# Patient Record
Sex: MALE | Race: WHITE | Employment: UNEMPLOYED | URBAN - METROPOLITAN AREA
[De-identification: names, ages, dates, MRNs, and addresses within clinical notes are randomized per-mention and may not be internally consistent; named-entity substitution may affect disease eponyms.]

---

## 2017-08-08 ENCOUNTER — HOSPITAL ENCOUNTER (OUTPATIENT)
Age: 82
Setting detail: SPECIMEN
Discharge: HOME | End: 2017-08-08
Payer: MEDICARE

## 2017-08-10 LAB — DERMATOLOGY PATHOLOGY REPORT: NORMAL

## 2017-10-11 ENCOUNTER — HOSPITAL ENCOUNTER (OUTPATIENT)
Age: 82
Discharge: HOME | End: 2017-10-11
Payer: MEDICARE

## 2022-11-30 ENCOUNTER — APPOINTMENT (OUTPATIENT)
Dept: GENERAL RADIOLOGY | Age: 87
End: 2022-11-30
Payer: MEDICARE

## 2022-11-30 ENCOUNTER — APPOINTMENT (OUTPATIENT)
Dept: CT IMAGING | Age: 87
End: 2022-11-30
Payer: MEDICARE

## 2022-11-30 ENCOUNTER — HOSPITAL ENCOUNTER (EMERGENCY)
Age: 87
Discharge: NURSING, REHAB OR LONG-TERM CARE | End: 2022-11-30
Attending: EMERGENCY MEDICINE
Payer: MEDICARE

## 2022-11-30 VITALS
TEMPERATURE: 97.9 F | SYSTOLIC BLOOD PRESSURE: 168 MMHG | DIASTOLIC BLOOD PRESSURE: 98 MMHG | HEART RATE: 76 BPM | OXYGEN SATURATION: 94 % | RESPIRATION RATE: 12 BRPM | HEIGHT: 60 IN

## 2022-11-30 DIAGNOSIS — E86.0 DEHYDRATION: ICD-10-CM

## 2022-11-30 DIAGNOSIS — U07.1 COVID-19: Primary | ICD-10-CM

## 2022-11-30 LAB
ABSOLUTE EOS #: <0.03 K/UL (ref 0–0.44)
ABSOLUTE IMMATURE GRANULOCYTE: 0.03 K/UL (ref 0–0.3)
ABSOLUTE LYMPH #: 0.77 K/UL (ref 1.1–3.7)
ABSOLUTE MONO #: 1.06 K/UL (ref 0.1–1.2)
ACETAMINOPHEN LEVEL: <10 UG/ML (ref 10–30)
ALBUMIN SERPL-MCNC: 3.4 G/DL (ref 3.5–5.2)
ALP BLD-CCNC: 83 U/L (ref 40–129)
ALT SERPL-CCNC: 16 U/L (ref 5–41)
AMMONIA: 14 UMOL/L (ref 16–60)
AMORPHOUS: ABNORMAL
AMPHETAMINE SCREEN URINE: NEGATIVE
ANION GAP SERPL CALCULATED.3IONS-SCNC: 12 MMOL/L (ref 9–17)
AST SERPL-CCNC: 21 U/L
BARBITURATE SCREEN URINE: NEGATIVE
BASOPHILS # BLD: 0 % (ref 0–2)
BASOPHILS ABSOLUTE: <0.03 K/UL (ref 0–0.2)
BENZODIAZEPINE SCREEN, URINE: NEGATIVE
BILIRUB SERPL-MCNC: 0.4 MG/DL (ref 0.3–1.2)
BILIRUBIN DIRECT: 0.2 MG/DL
BILIRUBIN URINE: NEGATIVE
BILIRUBIN, INDIRECT: 0.2 MG/DL (ref 0–1)
BUN BLDV-MCNC: 63 MG/DL (ref 8–23)
BUN/CREAT BLD: 26 (ref 9–20)
CALCIUM SERPL-MCNC: 9.1 MG/DL (ref 8.6–10.4)
CANNABINOID SCREEN URINE: NEGATIVE
CASTS UA: ABNORMAL /LPF
CASTS UA: ABNORMAL /LPF
CHLORIDE BLD-SCNC: 100 MMOL/L (ref 98–107)
CO2: 25 MMOL/L (ref 20–31)
COCAINE METABOLITE, URINE: NEGATIVE
COLOR: YELLOW
CREAT SERPL-MCNC: 2.39 MG/DL (ref 0.7–1.2)
EKG ATRIAL RATE: 56 BPM
EKG P AXIS: 31 DEGREES
EKG P-R INTERVAL: 162 MS
EKG Q-T INTERVAL: 452 MS
EKG QRS DURATION: 100 MS
EKG QTC CALCULATION (BAZETT): 436 MS
EKG R AXIS: 24 DEGREES
EKG T AXIS: 9 DEGREES
EKG VENTRICULAR RATE: 56 BPM
EOSINOPHILS RELATIVE PERCENT: 0 % (ref 1–4)
EPITHELIAL CELLS UA: ABNORMAL /HPF (ref 0–5)
ETHANOL PERCENT: <0.01 %
ETHANOL: <10 MG/DL
FENTANYL URINE: NEGATIVE
FLU A ANTIGEN: NEGATIVE
FLU B ANTIGEN: NEGATIVE
GFR SERPL CREATININE-BSD FRML MDRD: 25 ML/MIN/1.73M2
GLUCOSE BLD-MCNC: 118 MG/DL (ref 70–99)
GLUCOSE URINE: NEGATIVE
HCT VFR BLD CALC: 31.2 % (ref 40.7–50.3)
HEMOGLOBIN: 9.8 G/DL (ref 13–17)
IMMATURE GRANULOCYTES: 0 %
KETONES, URINE: NEGATIVE
LACTIC ACID, SEPSIS: 1 MMOL/L (ref 0.5–1.9)
LEUKOCYTE ESTERASE, URINE: NEGATIVE
LYMPHOCYTES # BLD: 11 % (ref 24–43)
MCH RBC QN AUTO: 30.2 PG (ref 25.2–33.5)
MCHC RBC AUTO-ENTMCNC: 31.4 G/DL (ref 28.4–34.8)
MCV RBC AUTO: 96 FL (ref 82.6–102.9)
METHADONE SCREEN, URINE: NEGATIVE
MONOCYTES # BLD: 14 % (ref 3–12)
MYOGLOBIN: 179 NG/ML (ref 28–72)
NITRITE, URINE: NEGATIVE
NRBC AUTOMATED: 0 PER 100 WBC
OPIATES, URINE: NEGATIVE
OXYCODONE SCREEN URINE: NEGATIVE
PDW BLD-RTO: 13 % (ref 11.8–14.4)
PH UA: 5.5 (ref 5–8)
PHENCYCLIDINE, URINE: NEGATIVE
PLATELET # BLD: 225 K/UL (ref 138–453)
PMV BLD AUTO: 9.5 FL (ref 8.1–13.5)
POTASSIUM SERPL-SCNC: 3.4 MMOL/L (ref 3.7–5.3)
PRO-BNP: 434 PG/ML
PROTEIN UA: ABNORMAL
RBC # BLD: 3.25 M/UL (ref 4.21–5.77)
RBC UA: ABNORMAL /HPF (ref 0–2)
SALICYLATE LEVEL: <1 MG/DL (ref 3–10)
SARS-COV-2, RAPID: DETECTED
SEG NEUTROPHILS: 75 % (ref 36–65)
SEGMENTED NEUTROPHILS ABSOLUTE COUNT: 5.44 K/UL (ref 1.5–8.1)
SODIUM BLD-SCNC: 137 MMOL/L (ref 135–144)
SPECIFIC GRAVITY UA: 1.02 (ref 1–1.03)
SPECIMEN DESCRIPTION: ABNORMAL
TEST INFORMATION: NORMAL
TOTAL PROTEIN: 6.8 G/DL (ref 6.4–8.3)
TOXIC TRICYCLIC SC,BLOOD: NEGATIVE
TROPONIN, HIGH SENSITIVITY: 65 NG/L (ref 0–22)
TURBIDITY: CLEAR
URINE HGB: ABNORMAL
UROBILINOGEN, URINE: NORMAL
WBC # BLD: 7.3 K/UL (ref 3.5–11.3)
WBC UA: ABNORMAL /HPF (ref 0–5)

## 2022-11-30 PROCEDURE — 87804 INFLUENZA ASSAY W/OPTIC: CPT

## 2022-11-30 PROCEDURE — 80179 DRUG ASSAY SALICYLATE: CPT

## 2022-11-30 PROCEDURE — 93005 ELECTROCARDIOGRAM TRACING: CPT | Performed by: NURSE PRACTITIONER

## 2022-11-30 PROCEDURE — 70450 CT HEAD/BRAIN W/O DYE: CPT

## 2022-11-30 PROCEDURE — 80307 DRUG TEST PRSMV CHEM ANLYZR: CPT

## 2022-11-30 PROCEDURE — 83874 ASSAY OF MYOGLOBIN: CPT

## 2022-11-30 PROCEDURE — 85025 COMPLETE CBC W/AUTO DIFF WBC: CPT

## 2022-11-30 PROCEDURE — G0480 DRUG TEST DEF 1-7 CLASSES: HCPCS

## 2022-11-30 PROCEDURE — 80048 BASIC METABOLIC PNL TOTAL CA: CPT

## 2022-11-30 PROCEDURE — 84484 ASSAY OF TROPONIN QUANT: CPT

## 2022-11-30 PROCEDURE — 82140 ASSAY OF AMMONIA: CPT

## 2022-11-30 PROCEDURE — 99285 EMERGENCY DEPT VISIT HI MDM: CPT

## 2022-11-30 PROCEDURE — 87635 SARS-COV-2 COVID-19 AMP PRB: CPT

## 2022-11-30 PROCEDURE — 83880 ASSAY OF NATRIURETIC PEPTIDE: CPT

## 2022-11-30 PROCEDURE — 81001 URINALYSIS AUTO W/SCOPE: CPT

## 2022-11-30 PROCEDURE — 71045 X-RAY EXAM CHEST 1 VIEW: CPT

## 2022-11-30 PROCEDURE — 83605 ASSAY OF LACTIC ACID: CPT

## 2022-11-30 PROCEDURE — 80143 DRUG ASSAY ACETAMINOPHEN: CPT

## 2022-11-30 PROCEDURE — 80076 HEPATIC FUNCTION PANEL: CPT

## 2022-11-30 RX ORDER — CARVEDILOL 25 MG/1
6.25 TABLET ORAL 2 TIMES DAILY
COMMUNITY
Start: 2022-02-03

## 2022-11-30 RX ORDER — 0.9 % SODIUM CHLORIDE 0.9 %
1000 INTRAVENOUS SOLUTION INTRAVENOUS ONCE
Status: DISCONTINUED | OUTPATIENT
Start: 2022-11-30 | End: 2022-11-30 | Stop reason: HOSPADM

## 2022-11-30 RX ORDER — MULTIVIT-MIN/FA/LYCOPEN/LUTEIN .4-300-25
TABLET ORAL
COMMUNITY
Start: 2022-11-27

## 2022-11-30 RX ORDER — TRAZODONE HYDROCHLORIDE 100 MG/1
100 TABLET ORAL
COMMUNITY
Start: 2022-11-27

## 2022-11-30 RX ORDER — SIMVASTATIN 20 MG
20 TABLET ORAL DAILY
COMMUNITY
Start: 2022-01-03

## 2022-11-30 RX ORDER — OMEPRAZOLE 40 MG/1
40 CAPSULE, DELAYED RELEASE ORAL DAILY
COMMUNITY
Start: 2022-01-03

## 2022-11-30 RX ORDER — QUETIAPINE FUMARATE 50 MG/1
50 TABLET, FILM COATED ORAL 2 TIMES DAILY
COMMUNITY
Start: 2022-11-27

## 2022-11-30 RX ORDER — RIVASTIGMINE TARTRATE 3 MG/1
3 CAPSULE ORAL 2 TIMES DAILY
COMMUNITY

## 2022-11-30 RX ORDER — SENNOSIDES 8.6 MG
650 CAPSULE ORAL DAILY
COMMUNITY
Start: 2022-11-27

## 2022-11-30 ASSESSMENT — ENCOUNTER SYMPTOMS
VOMITING: 0
COLOR CHANGE: 0
DIARRHEA: 0
COUGH: 0

## 2022-11-30 NOTE — ED NOTES
Updated daughter, Baldo Soto on results to date, discussed patient disposition and Baldo Soto does not want to have patient admitted or treated any further and would like patient discharged back to [facility] and for the patient to go under hospice care at [facility].. Updated Kg Morales CNP.      Getachew Ovalle., RN  44/21/94 1229

## 2022-11-30 NOTE — ED NOTES
Patient brought in per EMS from [facility] for change in mentation/LOC and increased combativeness. Patient does have a history of dementia, but facility reported decreased LOC and increased combativeness.       Abby Green., RN  49/66/13 1108

## 2022-11-30 NOTE — ED NOTES
Spoke with patient daughter, Bernice White, who is DPOA/healthcare for patient regarding patient code status and Gwendolyn Hernandez stated they do not wish to have any resuscitative efforts done on patient should his heart stop or the patient stops breathing.       Shane Lopez., RN  38/13/28 2236

## 2022-11-30 NOTE — ED NOTES
Returned call to Nelda Kang, [address] [place] [address] at [facility], advised of patient's daughter's wishes regarding discontinuation of treatment for patient and not wanting patient admitted to hospital and would like JUAN Pack to facilitate in proceeding with hospice care at [facility].       Narinder Abdi RN  08/01/83 5292

## 2022-11-30 NOTE — ED PROVIDER NOTES
Team [address] ED  eMERGENCY dEPARTMENT eNCOUnter      Pt Name: Lawrence Rock  MRN: [id number]  [place] 5/27/1931  Date of evaluation: 11/30/2022  Provider: BHAVESH Posada CNP    CHIEF COMPLAINT       Chief Complaint   Patient presents with    Altered Mental Status         HISTORY OF PRESENT ILLNESS  (Location/Symptom, Timing/Onset, Context/Setting, Quality, Duration, Modifying Factors, Severity.)   Lawrence Rock is a 80 y.o. male who presents to the emergency department. Via EMS for increasing AMS. Onset was this morning. He has a hx of dementia. Staff at his facility noticed he has been more combative today. He also appears fatigued which is not normal for him. He is a poor historian. HPI is limited due to his mental status. He appears in no acute distress. Nursing Notes were reviewed. ALLERGIES     Patient has no allergy information on record. CURRENT MEDICATIONS       Previous Medications    ACETAMINOPHEN (TYLENOL) 650 MG EXTENDED RELEASE TABLET    Take 650 mg by mouth daily    CARVEDILOL (COREG) 25 MG TABLET    Take 6.25 mg by mouth 2 times daily    MULTIPLE VITAMINS-MINERALS (CEROVITE SENIOR) TABS        OMEPRAZOLE (PRILOSEC) 40 MG DELAYED RELEASE CAPSULE    Take 40 mg by mouth daily    QUETIAPINE (SEROQUEL) 50 MG TABLET    Take 50 mg by mouth 2 times daily    RIVASTIGMINE (EXELON) 3 MG CAPSULE    Take 3 mg by mouth in the morning and at bedtime    SIMVASTATIN (ZOCOR) 20 MG TABLET    Take 20 mg by mouth daily    TRAZODONE (DESYREL) 100 MG TABLET    Take 100 mg by mouth nightly       PAST MEDICAL HISTORY   No past medical history on file. SURGICAL HISTORY     No past surgical history on file. FAMILY HISTORY     No family history on file. No family status information on file. SOCIAL HISTORY          REVIEW OF SYSTEMS    (2-9 systems for level 4, 10 or more for level 5)     Review of Systems   Constitutional:  Positive for fatigue.  Negative for diaphoresis and fever. ROS is limited due to the patient mental status, hx of dementia. Respiratory:  Negative for cough. Gastrointestinal:  Negative for diarrhea and vomiting. Skin:  Negative for color change, rash and wound. Psychiatric/Behavioral:  Positive for agitation. Except as noted above the remainder of the review of systems was reviewed and negative. PHYSICAL EXAM    (up to 7 for level 4, 8 or more for level 5)     ED Triage Vitals   BP Temp Temp src Pulse Resp SpO2 Height Weight   -- -- -- -- -- -- -- --     Physical Exam  Vitals reviewed. Constitutional:       General: He is not in acute distress. Appearance: He is well-developed. He is not diaphoretic. HENT:      Right Ear: External ear normal.      Left Ear: External ear normal.      Mouth/Throat:      Mouth: Mucous membranes are dry. Eyes:      General: No scleral icterus. Extraocular Movements: Extraocular movements intact. Conjunctiva/sclera: Conjunctivae normal.      Pupils: Pupils are equal, round, and reactive to light. Cardiovascular:      Rate and Rhythm: Normal rate and regular rhythm. Pulmonary:      Effort: Pulmonary effort is normal. No respiratory distress. Breath sounds: Normal breath sounds. No stridor. No wheezing. Abdominal:      General: There is no distension. Palpations: Abdomen is soft. Tenderness: There is no abdominal tenderness. There is no guarding. Musculoskeletal:      Cervical back: Neck supple. Right lower leg: No edema. Left lower leg: No edema. Comments: Moves extremities. Skin:     General: Skin is warm and dry. Findings: No rash. Neurological:      GCS: GCS eye subscore is 3. GCS verbal subscore is 4. GCS motor subscore is 6. Cranial Nerves: Cranial nerves 2-12 are intact. Motor: Motor function is intact. Coordination: Coordination is intact. Gait: Gait is intact.    Psychiatric:         Behavior: Behavior normal.        DIAGNOSTIC RESULTS     RADIOLOGY:   Non-plain film images such as CT, Ultrasound and MRI are read by the radiologist. Plain radiographic images are visualized and preliminarily interpreted by the emergency physician with the below findings:    Interpretation per the Radiologist below, if available at the time of this note:    CT HEAD WO CONTRAST    Result Date: 11/30/2022  EXAMINATION: CT OF THE HEAD WITHOUT CONTRAST  11/30/2022 11:53 am TECHNIQUE: CT of the head was performed without the administration of intravenous contrast. Automated exposure control, iterative reconstruction, and/or weight based adjustment of the mA/kV was utilized to reduce the radiation dose to as low as reasonably achievable. COMPARISON: None. HISTORY: ORDERING SYSTEM PROVIDED HISTORY: AMS, weakness TECHNOLOGIST PROVIDED HISTORY: AMS, weakness Decision Support Exception - unselect if not a suspected or confirmed emergency medical condition->Emergency Medical Condition (MA) Reason for Exam: AMS, weakness FINDINGS: BRAIN/VENTRICLES: There is no acute intracranial hemorrhage, mass effect or midline shift. No abnormal extra-axial fluid collection. The gray-white differentiation is maintained without evidence of an acute infarct. There is no evidence of hydrocephalus. Hypodensities in the white matter compatible with chronic microvascular ischemic change. ORBITS: The visualized portion of the orbits demonstrate no acute abnormality. SINUSES: There is opacification of the right maxillary sinus and ethmoid air cells. There is a right mastoid effusion. SOFT TISSUES/SKULL:  No acute abnormality of the visualized skull or soft tissues. No acute intracranial abnormality. Chronic microvascular ischemic changes in the white matter. Right mastoid effusion. Opacification of the right maxillary sinus and ethmoid air cells.      XR CHEST PORTABLE    Result Date: 11/30/2022  EXAMINATION: ONE XRAY VIEW OF THE CHEST 11/30/2022 11:05 am COMPARISON: Two-view chest from 12/08/2011 HISTORY: ORDERING SYSTEM PROVIDED HISTORY: AMS TECHNOLOGIST PROVIDED HISTORY: AMS Reason for Exam: SOB and AMS. FINDINGS: Tilted slightly rotated to the left. Overlying ECG monitor leads. Clips left axilla. Cardiac silhouette WNL in size; mediastinal structures appropriate for slight rotation with mildly elongated thoracic aorta with some calcification. Probable mild basilar atelectasis, more on the left, but no consolidation or sizable pleural effusion. No pneumothorax. Old left-sided rib fractures again seen. No definite acute fracture. No acute cardiopulmonary disease. Probable mild basilar atelectasis, more on the left. Old left-sided rib fractures.          LABS:  Labs Reviewed   COVID-19, RAPID - Abnormal; Notable for the following components:       Result Value    SARS-CoV-2, Rapid DETECTED (*)     All other components within normal limits   CBC WITH AUTO DIFFERENTIAL - Abnormal; Notable for the following components:    RBC 3.25 (*)     Hemoglobin 9.8 (*)     Hematocrit 31.2 (*)     Seg Neutrophils 75 (*)     Lymphocytes 11 (*)     Monocytes 14 (*)     Eosinophils % 0 (*)     Absolute Lymph # 0.77 (*)     All other components within normal limits   BASIC METABOLIC PANEL - Abnormal; Notable for the following components:    Glucose 118 (*)     BUN 63 (*)     Creatinine 2.39 (*)     Est, Glom Filt Rate 25 (*)     Bun/Cre Ratio 26 (*)     Potassium 3.4 (*)     All other components within normal limits   AMMONIA - Abnormal; Notable for the following components:    Ammonia 14 (*)     All other components within normal limits   TROP/MYOGLOBIN - Abnormal; Notable for the following components:    Troponin, High Sensitivity 65 (*)     Myoglobin 179 (*)     All other components within normal limits   BRAIN NATRIURETIC PEPTIDE - Abnormal; Notable for the following components:    Pro- (*)     All other components within normal limits   URINALYSIS WITH MICROSCOPIC - Abnormal; Notable for the following components:    Urine Hgb TRACE (*)     Protein, UA TRACE (*)     Amorphous, UA 1+ (*)     All other components within normal limits   TOX SCR, BLD, ED - Abnormal; Notable for the following components:    Acetaminophen Level <39 (*)     Salicylate Lvl <1 (*)     All other components within normal limits   HEPATIC FUNCTION PANEL - Abnormal; Notable for the following components:    Albumin 3.4 (*)     All other components within normal limits   RAPID INFLUENZA A/B ANTIGENS   LACTATE, SEPSIS   URINE DRUG SCREEN       All other labs were within normal range or not returned as of this dictation. EMERGENCY DEPARTMENT COURSE and DIFFERENTIAL DIAGNOSIS/MDM:   Vitals:    Vitals:    11/30/22 1049 11/30/22 1214 11/30/22 1254   BP: (!) 141/77 (!) 179/165 (!) 168/98   Pulse: 59 77 76   Resp:  13 12   Temp:   97.9 °F (36.6 °C)   SpO2:   94%   Height: 4' (1.219 m)         MEDICATIONS GIVEN IN THE ED:  Medications   0.9 % sodium chloride bolus (has no administration in time range)       CLINICAL DECISION MAKING:  The patient presented alert with a nontoxic appearance and was seen in conjunction with Dr. Cesilia Brenner. The patient tested positive for Covid-19. His BUN and creatinine were elevated; IV fluids were ordered. The patient took himself off of the monitor and pulled out his IV. Family was notified of this and of his test results. His daughter reported she is responsible for him as his wife also has dementia. Family declined additional treatment here at the hospital. They requested that the patient be transferred back to his nursing facility. They are going to contact hospice for further care. Additional testing was held at the family's request; the patient has a hx of dementia and is unable to consent for himself due to his mental status. Evaluation and treatment course in the ED, and plan of care upon discharge was discussed in length with the patient.   Patient had no further questions prior to being discharged and was instructed to return to the ED for new or worsening symptoms. Care was provided during an unprecedented national emergency due to the novel coronavirus, Covid-19. CONSULTS:  IP CONSULT TO HOSPICE        FINAL IMPRESSION      1. COVID-19    2. Dehydration            DISPOSITION/PLAN   DISPOSITION Decision To Discharge 11/30/2022 02:08:50 PM      PATIENT REFERRED TO:   DO Trey Correat[address]   [address] [address]  [phone]    Schedule an appointment as soon as possible for a visit       [facility] ED  [address] [facility]  [phone]    If symptoms worsen, As needed    DISCHARGE MEDICATIONS:     New Prescriptions    No medications on file           (Please note that portions of this note were completed with a voice recognition program.  Efforts were made to edit the dictations but occasionally words are mis-transcribed.)    BHAVESH Vazquez - Daniel 9, BHAVESH - CNP  11/30/22 9232

## 2022-11-30 NOTE — ED PROVIDER NOTES
eMERGENCY dEPARTMENT eNCOUnter   Independent Attestation     Pt Name: Vashti Talley  MRN: [id number]  [place] 5/27/1931  Date of evaluation: 11/30/22     Vashti Talley is a 80 y.o. male with CC: Altered Mental Status    Patient DNR-CC. Daughter in waiting room due to patient being COVID+. Expressed desire for discharge back to facility with plan for hospice consult/transition    This visit was performed by both a physician and an APC. I performed all aspects of the MDM as documented.     Lucas Beebe DO  Attending Emergency Physician                  Cesilia Morgan DO  11/30/22 9928

## 2022-11-30 NOTE — ED NOTES
Updated Meghan Zamora CNP and Dr. Jeny Niño on family wishes for patient to be [facility] code status, ok for patient to remain off monitor.       Ariana [place]., RN  52/79/68 9321

## 2022-11-30 NOTE — ED NOTES
Raul Henao, [facility] spoke with patient daughter in waiting area, as she was speaking to Carol Mayes, JUAN Pack called Carol Mayes and said that JUAN Pack would not be able to receive the patient back since he was combative. The patient has not been combative since his arrival here in the ED, did exhibiting typical dementia type behavior like pulling off gown, pulling on monitoring leads and discontinuing IV. Renee Marie will check into options for patient and family.       Francisco Knapp., RN  33/74/11 1216

## 2022-11-30 NOTE — ED NOTES
Spoke with [address]. Plan is to discharge patient back to [facility]. with hospice care. Referral sent to [facility] for hospice. Spoke with patients family. Daughter Bernett Barthel is POA and agrees that she would like patient to be back at [facility]. and utilize hospice there via home health agency.

## 2022-11-30 NOTE — DISCHARGE INSTR - COC
Continuity of Care Form    Patient Name: Dru Phillips   :  1931  MRN:  [id number]    Admit date:  2022  Discharge date:  2022    Code Status Order: No Order   Advance Directives:     Admitting Physician:  No admitting provider for patient encounter. PCP: oMde Kang DO    Discharging Nurse: Mary Schultz Unit/Room#: [facility]14/14  Discharging Unit Phone Number: [phone]    Emergency Contact:   Extended Emergency Contact Information  Primary Emergency Contact: Ada Byrd  Home Phone: [phone]  Relation: Child    Past Surgical History:  No past surgical history on file. Immunization History:   Immunization History   Administered Date(s) Administered    COVID-19, PFIZER GRAY top, DO NOT Dilute, (age 15 y+), IM, 30 mcg/0.3 mL 2022    COVID-19, PFIZER PURPLE top, DILUTE for use, (age 15 y+), 30mcg/0.3mL 2021, 2021, 10/20/2021       Active Problems: There is no problem list on file for this patient.       Isolation/Infection:   Isolation            No Isolation          Patient Infection Status       Infection Onset Added Last Indicated Last Indicated By Review Planned Expiration Resolved Resolved By    COVID-19 22 COVID-19, Rapid 12/10/22 12/14/22      Resolved    COVID-19 (Rule Out) 22 COVID-19, Rapid (Ordered)   22 Rule-Out Test Resulted            Nurse Assessment:  Last Vital Signs: BP (!) 168/98   Pulse 76   Temp 97.9 °F (36.6 °C)   Resp 12   Ht 4' (1.219 m)   SpO2 94%     Last documented pain score (0-10 scale):    Last Weight:   Wt Readings from Last 1 Encounters:   No data found for Wt     Mental Status:  {IP PT MENTAL STATUS:}    IV Access:  { ALBERTO IV ACCESS:772237751}    Nursing Mobility/ADLs:  Walking   Dependent  Transfer  Assisted  Bathing  Assisted  Dressing  Assisted  Toileting  Dependent  Feeding  Dependent  Med Admin  Dependent  Med Delivery   [facility] MED Delivery:579080188}    Wound Care Documentation and Therapy:        Elimination:  Continence: Bowel: {YES / PP:73360}  Bladder: {YES / DI:16458}  Urinary Catheter: {Urinary Catheter:706342740}   Colostomy/Ileostomy/Ileal Conduit: {YES / RJ:85666}       Date of Last BM: ***  No intake or output data in the 24 hours ending 22 1559  No intake/output data recorded.     Safety Concerns:     [address] [employer] Safety Concerns:184542485}    Impairments/Disabilities:      [employer] Impairments/Disabilities:363285244}    Nutrition Therapy:  Current Nutrition Therapy:   [employer] Diet List:580459148}    Routes of Feeding: {CHP DME Other Feedings:042654293}  Liquids: {Slp liquid thickness:02016}  Daily Fluid Restriction: {CHP DME Yes amt example:533001348}  Last Modified Barium Swallow with Video (Video Swallowing Test): {Done Not Done ROSQ:309284843}    Treatments at the Time of Hospital Discharge:   Respiratory Treatments: ***  Oxygen Therapy:  {Therapy; copd oxygen:32821}  Ventilator:    { CC Vent GUVR:377709242}    Rehab Therapies: {THERAPEUTIC INTERVENTION:3102122346}  Weight Bearing Status/Restrictions: [address] [place] [address] Weight Bearin}  Other Medical Equipment (for information only, NOT a DME order):  {EQUIPMENT:086992337}  Other Treatments: ***    Patient's personal belongings (please select all that are sent with patient):  {[facility] DME Belongings:892663242}    RN SIGNATURE:  {Esignature:789186536}    CASE MANAGEMENT/SOCIAL WORK SECTION    Inpatient Status Date: ***    Readmission Risk Assessment Score:  Readmission Risk              Risk of Unplanned Readmission:  0           Discharging to Facility/ Agency   Name: [facility]   Address: [address] [place] [address], [place], [address] [place] [address] Road  Phone: [phone]  Fax: [phone]    Dialysis Facility (if applicable)   Name:  Address:  Dialysis Schedule:  Phone:  Fax:    / signature: Electronically signed by KAYLYN Vega on 22 at 4:02 PM EST    PHYSICIAN SECTION    Prognosis: Guarded    Condition at Discharge: Stable    Rehab Potential (if transferring to Rehab): Poor    Recommended Labs or Other Treatments After Discharge: Palliative care/hospice consult    Physician Certification: I certify the above information and transfer of Bal Mcgee  is necessary for the continuing treatment of the diagnosis listed and that he requires Hospice for ~ 30 days.      Update Admission H&P: No change in H&P    PHYSICIAN SIGNATURE:  Electronically signed by Adam Phillips DO on 11/30/22 at 4:06 PM EST

## 2022-11-30 NOTE — ED NOTES
Patient returned from [address] and writer escorted family back to patient room and updated.       Pamela Kimbrought., RN  17/17/49 7184

## 2022-11-30 NOTE — ED NOTES
Updated [place] Reach on what diagnostic workup and interventions have been completed on patient and patient's behavior in ED. Jocelyne Petit is in contact with JUAN Pack and working on discharge of patient.       Mohsen Hale, AMANDA  89/80/63 6061

## 2022-11-30 NOTE — ED NOTES
Doc Wilda Herman crew here to  patient, report given and paperwork to staff, care of patient transferred to transport crew.       Theodora Shelley, RN  98/14/77 0476

## 2022-11-30 NOTE — ED NOTES
Patient discharging back to [facility]. who will then set up hospice care with another agency, Jeannette[facility] Warren has arranged, daughter, Poonam Rome updated.       Trang Mcintosh, AMANDA  36/82/03 6088

## 2022-11-30 NOTE — ED NOTES
Patient pulled out IV, pulled off cardiac monitoring patches, was pulling off ID band when writer entered the room. Writer did not put back on monitor as patient not leaving monitoring devices in place and is a DNR per daughter.       Ariana Adam., RN  99/24/17 8978